# Patient Record
Sex: FEMALE | Race: WHITE | NOT HISPANIC OR LATINO | URBAN - METROPOLITAN AREA
[De-identification: names, ages, dates, MRNs, and addresses within clinical notes are randomized per-mention and may not be internally consistent; named-entity substitution may affect disease eponyms.]

---

## 2020-11-17 ENCOUNTER — OFFICE (OUTPATIENT)
Dept: URBAN - METROPOLITAN AREA CLINIC 34 | Facility: CLINIC | Age: 55
End: 2020-11-17
Payer: COMMERCIAL

## 2020-11-17 VITALS
HEIGHT: 63 IN | SYSTOLIC BLOOD PRESSURE: 153 MMHG | WEIGHT: 150 LBS | DIASTOLIC BLOOD PRESSURE: 94 MMHG | HEART RATE: 84 BPM | TEMPERATURE: 97.2 F

## 2020-11-17 DIAGNOSIS — R13.19 OTHER DYSPHAGIA: ICD-10-CM

## 2020-11-17 DIAGNOSIS — R10.13 EPIGASTRIC PAIN: ICD-10-CM

## 2020-11-17 PROCEDURE — 99204 OFFICE O/P NEW MOD 45 MIN: CPT

## 2020-11-17 RX ORDER — PANTOPRAZOLE SODIUM 40 MG/1
TABLET, DELAYED RELEASE ORAL
Qty: 60 | Refills: 5 | Status: ACTIVE

## 2020-11-17 NOTE — SERVICEHPINOTES
DARLENE ESQUIVEL   is a   55   year old    female who is being seen in consultation at the request of   CATIE OLIVEIRA   for stomach pain. She reports postprandial epigastric pain that started approx. 1 month ago. No obvious trigger foods, can occur after eating any meal. She has noticed smaller meals do not cause pain so has only been eating a couple bites every couple hours. Pain is sharp and can last for hours. She has heartburn which is well controlled on pantoprazole 40mg BID--she has tried taking only once daily but has returning heartburn roe at night. She reports dysphagia to solids/liquids in proximal esophagus at times. She had an EGD with dilation 3-4 years ago in [place] which did resolve dysphagia for some time. Denies n/v or weight loss. Gallbladder in situ. No recent US or testing for symptoms. She is on narcotics for back pain and other joint pains. Despite this, BMs have been pretty regular. She has linzess 145mcg and takes this prn which seems to work well for her. Denies rectal bleeding or melena.  Her last colonoscopy was approx. 5 years ago ([place]) and believes she is due for another, has had polyps in the past. No family hx of GI cancer. No regular NSAIDs. She has COPD, not on supplemental O2.

## 2020-11-24 LAB
AMBIG ABBREV CMP14 DEFAULT: (no result)
AMYLASE: 33 U/L (ref 31–110)
COMP. METABOLIC PANEL (14): A/G RATIO: 3.3 — HIGH (ref 1.2–2.2)
COMP. METABOLIC PANEL (14): ALBUMIN: 4.6 G/DL (ref 3.8–4.9)
COMP. METABOLIC PANEL (14): ALKALINE PHOSPHATASE: 97 IU/L (ref 39–117)
COMP. METABOLIC PANEL (14): ALT (SGPT): 5 IU/L (ref 0–32)
COMP. METABOLIC PANEL (14): AST (SGOT): 7 IU/L (ref 0–40)
COMP. METABOLIC PANEL (14): BILIRUBIN, TOTAL: 0.3 MG/DL (ref 0–1.2)
COMP. METABOLIC PANEL (14): BUN/CREATININE RATIO: 11 (ref 9–23)
COMP. METABOLIC PANEL (14): BUN: 7 MG/DL (ref 6–24)
COMP. METABOLIC PANEL (14): CALCIUM: 9.4 MG/DL (ref 8.7–10.2)
COMP. METABOLIC PANEL (14): CARBON DIOXIDE, TOTAL: 24 MMOL/L (ref 20–29)
COMP. METABOLIC PANEL (14): CHLORIDE: 110 MMOL/L — HIGH (ref 96–106)
COMP. METABOLIC PANEL (14): CREATININE: 0.65 MG/DL (ref 0.57–1)
COMP. METABOLIC PANEL (14): EGFR IF AFRICN AM: 114 ML/MIN/1.73 (ref 59–?)
COMP. METABOLIC PANEL (14): EGFR IF NONAFRICN AM: 99 ML/MIN/1.73 (ref 59–?)
COMP. METABOLIC PANEL (14): GLOBULIN, TOTAL: 1.4 G/DL — LOW (ref 1.5–4.5)
COMP. METABOLIC PANEL (14): GLUCOSE: 101 MG/DL — HIGH (ref 65–99)
COMP. METABOLIC PANEL (14): POTASSIUM: 4 MMOL/L (ref 3.5–5.2)
COMP. METABOLIC PANEL (14): PROTEIN, TOTAL: 6 G/DL (ref 6–8.5)
COMP. METABOLIC PANEL (14): SODIUM: 146 MMOL/L — HIGH (ref 134–144)
LIPASE: 14 U/L (ref 14–72)

## 2020-12-08 ENCOUNTER — ON CAMPUS - OUTPATIENT (OUTPATIENT)
Dept: URBAN - METROPOLITAN AREA HOSPITAL 37 | Facility: HOSPITAL | Age: 55
End: 2020-12-08

## 2020-12-08 DIAGNOSIS — K29.60 OTHER GASTRITIS WITHOUT BLEEDING: ICD-10-CM

## 2020-12-08 DIAGNOSIS — R13.10 DYSPHAGIA, UNSPECIFIED: ICD-10-CM

## 2020-12-08 DIAGNOSIS — R10.13 EPIGASTRIC PAIN: ICD-10-CM

## 2020-12-08 PROCEDURE — 43239 EGD BIOPSY SINGLE/MULTIPLE: CPT | Performed by: INTERNAL MEDICINE
